# Patient Record
Sex: FEMALE | Race: BLACK OR AFRICAN AMERICAN | NOT HISPANIC OR LATINO | URBAN - METROPOLITAN AREA
[De-identification: names, ages, dates, MRNs, and addresses within clinical notes are randomized per-mention and may not be internally consistent; named-entity substitution may affect disease eponyms.]

---
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
## 2023-07-12 ENCOUNTER — EMERGENCY (EMERGENCY)
Facility: HOSPITAL | Age: 18
LOS: 1 days | Discharge: HOME | End: 2023-07-12
Attending: EMERGENCY MEDICINE
Payer: COMMERCIAL

## 2023-07-12 VITALS
SYSTOLIC BLOOD PRESSURE: 116 MMHG | RESPIRATION RATE: 16 BRPM | TEMPERATURE: 99 F | HEART RATE: 92 BPM | DIASTOLIC BLOOD PRESSURE: 77 MMHG | OXYGEN SATURATION: 98 %

## 2023-07-12 VITALS
HEART RATE: 80 BPM | RESPIRATION RATE: 18 BRPM | SYSTOLIC BLOOD PRESSURE: 110 MMHG | DIASTOLIC BLOOD PRESSURE: 79 MMHG | OXYGEN SATURATION: 99 % | TEMPERATURE: 98 F

## 2023-07-12 PROCEDURE — 99284 EMERGENCY DEPT VISIT MOD MDM: CPT

## 2023-07-12 RX ORDER — LIDOCAINE 4 G/100G
10 CREAM TOPICAL ONCE
Refills: 0 | Status: COMPLETED | OUTPATIENT
Start: 2023-07-12 | End: 2023-07-12

## 2023-07-12 RX ADMIN — LIDOCAINE 10 MILLILITER(S): 4 CREAM TOPICAL at 02:51

## 2023-07-12 NOTE — ED PROVIDER NOTE - PATIENT PORTAL LINK FT
You can access the FollowMyHealth Patient Portal offered by [facility] by registering at the following website: [email or web address]. By joining [facility]’s FollowMyHealth portal, you will also be able to view your health information using other applications (apps) compatible with our system.

## 2023-07-12 NOTE — ED PROVIDER NOTE - NSFOLLOWUPINSTRUCTIONS_ED_ALL_ED_FT
Please take ibuprofen or tylenol as needed for pain  Apply ice to area  Please follow up with primary care doctor in 2-3 days  Return to ER for any new or worsening symptoms
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
## 2023-07-12 NOTE — ED ADULT NURSE REASSESSMENT NOTE - NS ED NURSE REASSESS COMMENT FT1
Late Note  Report received from outgoing RN at change of shift and assumed care of pt at that time. Pt is awake, alert and oriented x 3, reports she has a safe place to go and is waiting for her friend to drive her there. Pt reports she is going to a shelter and does not need to speak to social work. MARU Ramirez made aware.
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
## 2023-07-12 NOTE — ED PROVIDER NOTE - NS ED ATTENDING STATEMENT MOD
This was a shared visit with the DESMOND. I reviewed and verified the documentation and independently performed the documented:
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
## 2023-12-16 ENCOUNTER — NON-APPOINTMENT (OUTPATIENT)
Age: 18
End: 2023-12-16

## 2024-07-17 ENCOUNTER — OUTPATIENT (OUTPATIENT)
Dept: OUTPATIENT SERVICES | Facility: HOSPITAL | Age: 19
LOS: 1 days | End: 2024-07-17
Payer: COMMERCIAL

## 2024-07-17 VITALS
HEART RATE: 68 BPM | SYSTOLIC BLOOD PRESSURE: 113 MMHG | TEMPERATURE: 97 F | RESPIRATION RATE: 18 BRPM | DIASTOLIC BLOOD PRESSURE: 56 MMHG

## 2024-07-17 DIAGNOSIS — O26.899 OTHER SPECIFIED PREGNANCY RELATED CONDITIONS, UNSPECIFIED TRIMESTER: ICD-10-CM

## 2024-07-17 PROCEDURE — G0463: CPT

## 2024-07-17 NOTE — OB PROVIDER TRIAGE NOTE - NSOBPROVIDERNOTE_OBGYN_ALL_OB_FT
A/P: 19y  at 61qrh8o GA who presents to L&D for lower abdominal pain with fetal movement that began around 10:00am today.     - FHT reassuring  - [place]: no contractions  - Recommended Tylenol for pain control  - Dispo: Discharge home with return precautions    Discussed with Dr. Hollingsworth A/P: 19y  at [id number] GA evaluated for abdominal discomfort with fetal movement.     - abdominal exam unremarkable, nontender  - reassured that fetal movement is a good sign  - fhr present, no contractions on tocometry  - clear for dc, return precautions reviewed     Discussed with Dr. Hollingsworth

## 2024-07-17 NOTE — OB PROVIDER TRIAGE NOTE - ATTENDING COMMENTS
19y  at [id number] GA who presents to L&D c/o mild abdominal discomfort with fetal movements. Denies vaginal bleeding, contractions and leakage of fluid. She endorses good fetal movement. Pt denies headaches, visual disturbances, RUQ pain, epigastric pain and new-onset edema. She denies any urinary complaints. She denies fevers, chills, nausea, vomiting. No other complaints.    Pregnancy course is significant for:  failed 1hr glucose testing; 3hr result pending  anemia    Gen: NAD, well-appearing  Heart: RRR  Lungs: CTAB    19y  at 10toh8x GA evaluated for abdominal discomfort with fetal movement.     - abdominal exam unremarkable, nontender  - reassured that fetal movement is a good sign  - fhr present, no contractions on tocometry  - clear for dc, return precautions reviewed

## 2024-07-17 NOTE — OB PROVIDER TRIAGE NOTE - HISTORY OF PRESENT ILLNESS
IWONA ADAMS is a 19y  at [id number] GA who presents to L&D for lower abdominal pain that began around 10:00am today. The patient reports 7/10 pain that occurs with fetal movement and is worse with sitting/lying down. She denies any interventions to improve this pain.     Pt denies vaginal bleeding, contractions and leakage of fluid. She endorses good fetal movement.   Pt denies headaches, visual disturbances, RUQ pain, epigastric pain and new-onset edema.   She denies any urinary complaints.   She denies fevers, chills, nausea, vomiting.     Pregnancy course is significant for:  failed 1hr glucose testing; 3hr result pending  anemia    POB: ; termination   PGYN: -fibroids/-cysts, denied STD hx, denies abnormal PAPs  PMH: asthma  PSH: none reported  SH: Denies tobacco use, EtOH use and illicit drug use during the pregnancy; Feels safe at home  Meds: PNV, ASA, iron, albuterol inhaler (not used during this pregnancy)  All: pollen   IWONA ADAMS is a 19y  at 43srd2t GA who presents to L&D c/o mild abdominal discomfort with fetal movements. Denies vaginal bleeding, contractions and leakage of fluid. She endorses good fetal movement. Pt denies headaches, visual disturbances, RUQ pain, epigastric pain and new-onset edema. She denies any urinary complaints. She denies fevers, chills, nausea, vomiting. No other complaints.    Pregnancy course is significant for:  failed 1hr glucose testing; 3hr result pending  anemia    POB: ; termination   PGYN: -fibroids/-cysts, denied STD hx, denies abnormal PAPs  PMH: asthma  PSH: none reported  SH: Denies tobacco use, EtOH use and illicit drug use during the pregnancy; Feels safe at home  Meds: PNV, ASA, iron, albuterol inhaler (not used during this pregnancy)  All: pollen

## 2024-07-17 NOTE — OB RN TRIAGE NOTE - FALL HARM RISK - UNIVERSAL INTERVENTIONS
Bed in lowest position, wheels locked, appropriate side rails in place/Call bell, personal items and telephone in reach/Instruct patient to call for assistance before getting out of bed or chair/Non-slip footwear when patient is out of bed/[place] to call system/Physically safe environment - no spills, clutter or unnecessary equipment/Purposeful Proactive Rounding/Room/bathroom lighting operational, light cord in reach

## 2024-07-17 NOTE — OB PROVIDER TRIAGE NOTE - NSHPPHYSICALEXAM_GEN_ALL_CORE
Gen: NAD, well-appearing  Heart: RRR  Lungs: CTAB  Abd: soft, gravid  Ext: non-edematous, non-tender     FHT: baseline 150, fetal movement  [place]: no contractions Vital Signs Last 24 Hrs  T(C): 36.3 (17 Jul 2024 23:04), Max: 36.3 (17 Jul 2024 23:04)  T(F): 97.4 (17 Jul 2024 23:04), Max: 97.4 (17 Jul 2024 23:04)  HR: 68 (17 Jul 2024 23:04) (68 - 68)  BP: 113/56 (17 Jul 2024 23:04) (113/56 - 113/56)  RR: 18 (17 Jul 2024 23:04) (18 - 18)    Parameters below as of 17 Jul 2024 23:04  Patient On (Oxygen Delivery Method): room air    Gen: NAD, well-appearing  Heart: RRR  Lungs: CTAB  Abd: soft, gravid, nontender  Ext: non-edematous, non-tender     FHR 150s, grossly nl fetal movement  [place]: no contractions

## 2024-07-18 PROCEDURE — 99283 EMERGENCY DEPT VISIT LOW MDM: CPT

## 2024-07-19 DIAGNOSIS — O99.012 ANEMIA COMPLICATING PREGNANCY, SECOND TRIMESTER: ICD-10-CM

## 2024-07-19 DIAGNOSIS — R10.9 UNSPECIFIED ABDOMINAL PAIN: ICD-10-CM

## 2024-07-19 DIAGNOSIS — J45.909 UNSPECIFIED ASTHMA, UNCOMPLICATED: ICD-10-CM

## 2024-07-19 DIAGNOSIS — D64.9 ANEMIA, UNSPECIFIED: ICD-10-CM

## 2024-07-19 DIAGNOSIS — O99.512 DISEASES OF THE RESPIRATORY SYSTEM COMPLICATING PREGNANCY, SECOND TRIMESTER: ICD-10-CM

## 2024-07-19 DIAGNOSIS — Z3A.24 24 WEEKS GESTATION OF PREGNANCY: ICD-10-CM

## 2024-07-19 DIAGNOSIS — O26.892 OTHER SPECIFIED PREGNANCY RELATED CONDITIONS, SECOND TRIMESTER: ICD-10-CM

## 2024-08-23 NOTE — OB PROVIDER TRIAGE NOTE - LIVING CHILDREN, OB PROFILE
[facility] EMERGENCY ROOM  [address] [place] [address]  Phone: [phone]  Fax: [phone]    Bianca Boles was seen and treated in our emergency department on 8/23/2024.  She may return to work on 08/24/2024.         Thank you for choosing [facility].    Efren Guerra, DO      
0

## 2024-09-04 ENCOUNTER — OUTPATIENT (OUTPATIENT)
Dept: INPATIENT UNIT | Facility: HOSPITAL | Age: 19
LOS: 1 days | End: 2024-09-04
Payer: MEDICAID

## 2024-09-04 VITALS
DIASTOLIC BLOOD PRESSURE: 70 MMHG | SYSTOLIC BLOOD PRESSURE: 114 MMHG | TEMPERATURE: 98 F | HEART RATE: 73 BPM | RESPIRATION RATE: 18 BRPM

## 2024-09-04 DIAGNOSIS — O26.899 OTHER SPECIFIED PREGNANCY RELATED CONDITIONS, UNSPECIFIED TRIMESTER: ICD-10-CM

## 2024-09-04 PROCEDURE — 99221 1ST HOSP IP/OBS SF/LOW 40: CPT | Mod: 25,GC

## 2024-09-04 PROCEDURE — 59025 FETAL NON-STRESS TEST: CPT | Mod: 26

## 2024-09-05 VITALS — HEART RATE: 86 BPM | SYSTOLIC BLOOD PRESSURE: 131 MMHG | DIASTOLIC BLOOD PRESSURE: 73 MMHG

## 2024-09-05 PROBLEM — D64.9 ANEMIA, UNSPECIFIED: Chronic | Status: ACTIVE | Noted: 2024-07-17

## 2024-09-05 PROBLEM — R73.03 PREDIABETES: Chronic | Status: ACTIVE | Noted: 2024-07-17

## 2024-09-05 LAB
A1C WITH ESTIMATED AVERAGE GLUCOSE RESULT: 4.8 % — SIGNIFICANT CHANGE UP (ref 4–5.6)
AMPHET UR-MCNC: NEGATIVE — SIGNIFICANT CHANGE UP
APPEARANCE UR: ABNORMAL
BACTERIA # UR AUTO: ABNORMAL /HPF
BARBITURATES UR SCN-MCNC: NEGATIVE — SIGNIFICANT CHANGE UP
BENZODIAZ UR-MCNC: NEGATIVE — SIGNIFICANT CHANGE UP
BILIRUB UR-MCNC: NEGATIVE — SIGNIFICANT CHANGE UP
BLD GP AB SCN SERPL QL: SIGNIFICANT CHANGE UP
CAST: 6 /LPF — HIGH (ref 0–4)
COCAINE METAB.OTHER UR-MCNC: NEGATIVE — SIGNIFICANT CHANGE UP
COD CRY URNS QL: PRESENT
COLOR SPEC: SIGNIFICANT CHANGE UP
DIFF PNL FLD: NEGATIVE — SIGNIFICANT CHANGE UP
ESTIMATED AVERAGE GLUCOSE: 91 MG/DL — SIGNIFICANT CHANGE UP (ref 68–114)
FENTANYL UR QL SCN: NEGATIVE — SIGNIFICANT CHANGE UP
GLUCOSE UR QL: NEGATIVE MG/DL — SIGNIFICANT CHANGE UP
HBV SURFACE AG SERPL QL IA: SIGNIFICANT CHANGE UP
HCT VFR BLD CALC: 34.8 % — SIGNIFICANT CHANGE UP (ref 34.5–45)
HCV AB S/CO SERPL IA: 0.16 S/CO — SIGNIFICANT CHANGE UP (ref 0–0.99)
HCV AB SERPL-IMP: SIGNIFICANT CHANGE UP
HGB BLD-MCNC: 12.1 G/DL — SIGNIFICANT CHANGE UP (ref 11.5–15.5)
HIV 1 & 2 AB SERPL IA.RAPID: SIGNIFICANT CHANGE UP
HIV 1+2 AB+HIV1 P24 AG SERPL QL IA: SIGNIFICANT CHANGE UP
KETONES UR-MCNC: 40 MG/DL
LEUKOCYTE ESTERASE UR-ACNC: ABNORMAL
MCHC RBC-ENTMCNC: 30.6 PG — SIGNIFICANT CHANGE UP (ref 27–34)
MCHC RBC-ENTMCNC: 34.8 GM/DL — SIGNIFICANT CHANGE UP (ref 32–36)
MCV RBC AUTO: 88.1 FL — SIGNIFICANT CHANGE UP (ref 80–100)
METHADONE UR-MCNC: NEGATIVE — SIGNIFICANT CHANGE UP
MEV IGG SER-ACNC: >300 AU/ML — SIGNIFICANT CHANGE UP
MEV IGG+IGM SER-IMP: POSITIVE — SIGNIFICANT CHANGE UP
MUV AB SER-ACNC: NEGATIVE — SIGNIFICANT CHANGE UP
MUV IGG FLD-ACNC: 6.56 AU/ML — SIGNIFICANT CHANGE UP
NITRITE UR-MCNC: NEGATIVE — SIGNIFICANT CHANGE UP
OPIATES UR-MCNC: NEGATIVE — SIGNIFICANT CHANGE UP
PCP SPEC-MCNC: SIGNIFICANT CHANGE UP
PCP UR-MCNC: NEGATIVE — SIGNIFICANT CHANGE UP
PH UR: 6.5 — SIGNIFICANT CHANGE UP (ref 5–8)
PLATELET # BLD AUTO: 256 K/UL — SIGNIFICANT CHANGE UP (ref 150–400)
PROT UR-MCNC: 30 MG/DL
RBC # BLD: 3.95 M/UL — SIGNIFICANT CHANGE UP (ref 3.8–5.2)
RBC # FLD: 16.1 % — HIGH (ref 10.3–14.5)
RBC CASTS # UR COMP ASSIST: 2 /HPF — SIGNIFICANT CHANGE UP (ref 0–4)
RUBV IGG SER-ACNC: 2.42 INDEX — SIGNIFICANT CHANGE UP
RUBV IGG SER-IMP: POSITIVE — SIGNIFICANT CHANGE UP
SP GR SPEC: 1.02 — SIGNIFICANT CHANGE UP (ref 1–1.03)
SQUAMOUS # UR AUTO: 22 /HPF — HIGH (ref 0–5)
T PALLIDUM AB TITR SER: NEGATIVE — SIGNIFICANT CHANGE UP
THC UR QL: NEGATIVE — SIGNIFICANT CHANGE UP
UROBILINOGEN FLD QL: 1 MG/DL — SIGNIFICANT CHANGE UP (ref 0.2–1)
VZV IGG FLD QL IA: 615.8 INDEX — SIGNIFICANT CHANGE UP
VZV IGG FLD QL IA: POSITIVE — SIGNIFICANT CHANGE UP
WBC # BLD: 6.81 K/UL — SIGNIFICANT CHANGE UP (ref 3.8–10.5)
WBC # FLD AUTO: 6.81 K/UL — SIGNIFICANT CHANGE UP (ref 3.8–10.5)
WBC UR QL: 73 /HPF — HIGH (ref 0–5)

## 2024-09-05 PROCEDURE — 86480 TB TEST CELL IMMUN MEASURE: CPT

## 2024-09-05 PROCEDURE — 87389 HIV-1 AG W/HIV-1&-2 AB AG IA: CPT

## 2024-09-05 PROCEDURE — 80307 DRUG TEST PRSMV CHEM ANLYZR: CPT

## 2024-09-05 PROCEDURE — 86762 RUBELLA ANTIBODY: CPT

## 2024-09-05 PROCEDURE — G0463: CPT

## 2024-09-05 PROCEDURE — 86703 HIV-1/HIV-2 1 RESULT ANTBDY: CPT

## 2024-09-05 PROCEDURE — 86901 BLOOD TYPING SEROLOGIC RH(D): CPT

## 2024-09-05 PROCEDURE — 86787 VARICELLA-ZOSTER ANTIBODY: CPT

## 2024-09-05 PROCEDURE — 36415 COLL VENOUS BLD VENIPUNCTURE: CPT

## 2024-09-05 PROCEDURE — 87340 HEPATITIS B SURFACE AG IA: CPT

## 2024-09-05 PROCEDURE — 81001 URINALYSIS AUTO W/SCOPE: CPT

## 2024-09-05 PROCEDURE — 86803 HEPATITIS C AB TEST: CPT

## 2024-09-05 PROCEDURE — 86735 MUMPS ANTIBODY: CPT

## 2024-09-05 PROCEDURE — 85027 COMPLETE CBC AUTOMATED: CPT

## 2024-09-05 PROCEDURE — G0378: CPT

## 2024-09-05 PROCEDURE — 86780 TREPONEMA PALLIDUM: CPT

## 2024-09-05 PROCEDURE — 86900 BLOOD TYPING SEROLOGIC ABO: CPT

## 2024-09-05 PROCEDURE — 83036 HEMOGLOBIN GLYCOSYLATED A1C: CPT

## 2024-09-05 PROCEDURE — 86765 RUBEOLA ANTIBODY: CPT

## 2024-09-05 PROCEDURE — 86850 RBC ANTIBODY SCREEN: CPT

## 2024-09-05 NOTE — OB PROVIDER TRIAGE NOTE - NSOBPROVIDERNOTE_OBGYN_ALL_OB_FT
Oriented - self; Oriented - place; Oriented - time Alert and oriented to person, place and time A/P: 19y  at 31w1d GA who presented to L&D for rule out labor.    FHT: reactive FHT in triage which became non-reactive during observation from 2330 to 0130. Patient repositioned, given food/drink and was closely monitored. Moderate variability restored at 0130, sustained.   [place]: irregular which eventually tapered out.   SVE: 0/0/-3, no pooling of fluid or vaginal bleeding.  [place]: Vertex presentation. Fetal movement noted. Normal fluid.  Vital signs remained stable throughout.     -Prenatal Labs and UTOX sent. To be followed up on.     Dispo: Patient for discharge. Discharge instructions given including info for [facility] outpt follow-up, bleeding, [facility], ctx, labor and follow up visit. Patient stated understanding.     Patient seen and reviewed by Dr DANII Andrade.

## 2024-09-05 NOTE — OB PROVIDER TRIAGE NOTE - HISTORY OF PRESENT ILLNESS
IWONA ADAMS is a 19y  at 31w1d GA who presented to L&D for rule out labor. CTX started 9am and were infrequent at the time. She was able to go to work without any discomfort. At 4pm patient noted CTX becoming more frequent. Patient states that she had been feeling CTX since 29 wks but they usually occurred once weekly and dissipated throughout the day. Patient has not been f/u by an OB provider for the past three months due to an issue with her insurance. She states that she had a 1hr GTT done which came back abnormal and then underwent the 3hr GTT but never got back the results. Patient denies any other pregnancy complications.    Pt denies vaginal bleeding and leakage of fluid. She endorses good fetal movement.   Pt denies trauma.   She denies any urinary complaints.   She denies fevers, chills, nausea, vomiting.     Pregnancy course is significant for no prenatal care for the last three months and an abnormal 1hr GTT.     POB:  - TOPx1 IN   PGYN: -fibroids/-cysts, STD hx +ve for Herpes - always asymptomatic, denies abnormal PAPs  PMH: Denies  PSH: Denies   SH: Denies tobacco use, EtOH use and illicit drug use during the pregnancy; Feels safe at home  Meds: PNVs, Aspirin (started by OB provider as a precaution in second trimester)  All: NKDA, allergic to tomatoes

## 2024-09-05 NOTE — OB PROVIDER TRIAGE NOTE - ATTENDING COMMENTS
19y  at 31w1d GA who presented to L&D for rule out labor, concerned re her inability to access prenatal care due to an insurance change. Reassuring maternal and fetal testing, referred to [facility], prenatal labs checked and reaccuring

## 2024-09-05 NOTE — OB PROVIDER TRIAGE NOTE - NSHPPHYSICALEXAM_GEN_ALL_CORE
Vitals:  T(C): 36.5 (09-04-24 @ 20:31), Max: 36.5 (09-04-24 @ 20:31)  HR: 86 (09-05-24 @ 01:46) (73 - 86)  BP: 131/73 (09-05-24 @ 01:46) (114/70 - 131/73)  RR: 18 (09-04-24 @ 20:31) (18 - 18)  SpO2: 99    Gen: NAD, well-appearing  Lungs: saturating well on room air   Abd: soft, gravid  Ext: non-edematous, non-tender   SVE: Closed internal OS - 0/0/-3. No pooling of fluid.    FHT: 130bpm, moderate variability, + accels, two spontaneous decels noted - resolved with repositioning.   [place]: irregular CTX

## 2024-09-06 DIAGNOSIS — O47.03 FALSE LABOR BEFORE 37 COMPLETED WEEKS OF GESTATION, THIRD TRIMESTER: ICD-10-CM

## 2024-09-06 DIAGNOSIS — D64.9 ANEMIA, UNSPECIFIED: ICD-10-CM

## 2024-09-06 DIAGNOSIS — Z3A.31 31 WEEKS GESTATION OF PREGNANCY: ICD-10-CM

## 2024-09-06 DIAGNOSIS — O99.013 ANEMIA COMPLICATING PREGNANCY, THIRD TRIMESTER: ICD-10-CM

## 2024-09-07 LAB
GAMMA INTERFERON BACKGROUND BLD IA-ACNC: 0.02 IU/ML — SIGNIFICANT CHANGE UP
M TB IFN-G BLD-IMP: NEGATIVE — SIGNIFICANT CHANGE UP
M TB IFN-G CD4+ BCKGRND COR BLD-ACNC: 0.02 IU/ML — SIGNIFICANT CHANGE UP
M TB IFN-G CD4+CD8+ BCKGRND COR BLD-ACNC: 0 IU/ML — SIGNIFICANT CHANGE UP
QUANT TB PLUS MITOGEN MINUS NIL: 1.08 IU/ML — SIGNIFICANT CHANGE UP